# Patient Record
Sex: FEMALE | Race: WHITE | NOT HISPANIC OR LATINO | Employment: PART TIME | URBAN - METROPOLITAN AREA
[De-identification: names, ages, dates, MRNs, and addresses within clinical notes are randomized per-mention and may not be internally consistent; named-entity substitution may affect disease eponyms.]

---
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
## 2018-02-27 ENCOUNTER — OFFICE VISIT (OUTPATIENT)
Dept: MEDICAL GROUP | Facility: MEDICAL CENTER | Age: 18
End: 2018-02-27
Attending: INTERNAL MEDICINE
Payer: MEDICAID

## 2018-02-27 VITALS
DIASTOLIC BLOOD PRESSURE: 70 MMHG | SYSTOLIC BLOOD PRESSURE: 118 MMHG | OXYGEN SATURATION: 96 % | HEIGHT: 64 IN | TEMPERATURE: 97.7 F | HEART RATE: 82 BPM | WEIGHT: 123 LBS | RESPIRATION RATE: 16 BRPM | BODY MASS INDEX: 21 KG/M2

## 2018-02-27 DIAGNOSIS — Z72.0 TOBACCO USE: ICD-10-CM

## 2018-02-27 DIAGNOSIS — M79.645 PAIN OF FINGER OF LEFT HAND: ICD-10-CM

## 2018-02-27 DIAGNOSIS — J45.20 MILD INTERMITTENT ASTHMA WITHOUT COMPLICATION: ICD-10-CM

## 2018-02-27 PROBLEM — Z97.5 IUD (INTRAUTERINE DEVICE) IN PLACE: Status: ACTIVE | Noted: 2018-02-27

## 2018-02-27 PROBLEM — F12.90 MARIJUANA USE: Status: ACTIVE | Noted: 2018-02-27

## 2018-02-27 PROBLEM — Z86.59 HISTORY OF ANXIETY: Status: ACTIVE | Noted: 2018-02-27

## 2018-02-27 PROCEDURE — 99204 OFFICE O/P NEW MOD 45 MIN: CPT | Performed by: INTERNAL MEDICINE

## 2018-02-27 ASSESSMENT — PAIN SCALES - GENERAL: PAINLEVEL: 3=SLIGHT PAIN

## 2018-02-27 ASSESSMENT — PATIENT HEALTH QUESTIONNAIRE - PHQ9: CLINICAL INTERPRETATION OF PHQ2 SCORE: 0

## 2018-02-27 NOTE — PROGRESS NOTES
Marla Baca is a 17 y.o. female here for left fifth finger pain, establish care  HPI:    Mild intermittent asthma without complication  Patient reports a history of asthma since childhood. States that when she was younger she had numerous emergency room visits asthma exacerbations but these have tapered off that she's gotten older. She currently uses an albuterol inhaler about 4 times a week. She feels like her main triggers are cigarette smoking which she continues to do as well as allergies.   She has never been intubated for her asthma. She required a daily corticosteroid inhaler in the past but has not used this for several years.    Pain of finger of left hand  Patient reports one month ago she went snowboarding and fell. She landed on her left hand and  the pinky from the rest of the fingers. She had some swelling and pain in the fifth finger following that. She Buddy taped her fourth and fifth finger together for about a week. She continues to have some mild pain in the finger and she cannot fully flex it. Her PIP joint is swollen. Overall she feels like it is getting better.     Tobacco use  Patient currently smokes 3-4 cigarettes per day. She does not feel she is completely ready to quit at this point.    Current medicines (including changes today)  Current Outpatient Prescriptions   Medication Sig Dispense Refill   • albuterol 108 (90 BASE) MCG/ACT Aero Soln inhalation aerosol Inhale 2 Puffs by mouth every four hours as needed for Shortness of Breath. 1 Inhaler 0   • albuterol (PROVENTIL) 2.5mg/3ml Nebu Soln solution for nebulization 2.5 mg by Nebulization route every four hours as needed for Shortness of Breath.       No current facility-administered medications for this visit.      She  has a past medical history of Allergy; Anxiety; Asthma; Depression; Heart murmur; and Substance abuse.  She  has a past surgical history that includes nasal polypectomy.  Social History   Substance Use Topics  "  • Smoking status: Current Every Day Smoker     Packs/day: 0.25     Years: 2.00     Types: Cigarettes   • Smokeless tobacco: Never Used   • Alcohol use 1.2 oz/week     2 Cans of beer per week     Social History     Social History Narrative   • No narrative on file     Family History   Problem Relation Age of Onset   • Cancer Maternal Grandfather    • Diabetes Neg Hx    • Heart Disease Neg Hx    • Stroke Neg Hx    • Hyperlipidemia Neg Hx    • Hypertension Neg Hx          ROS  As above in HPI  All other systems reviewed and are negative     Objective:     Blood pressure 118/70, pulse 82, temperature 36.5 °C (97.7 °F), resp. rate 16, height 1.613 m (5' 3.5\"), weight 55.8 kg (123 lb), SpO2 96 %, not currently breastfeeding. Body mass index is 21.44 kg/m².  Physical Exam:    Constitutional: Alert, no distress.  Skin: Warm, dry, good turgor, no rashes in visible areas.  Eye: Equal, round and reactive, conjunctiva clear, lids normal.  ENMT: Lips without lesions, good dentition, oropharynx clear, TM's clear bilaterally.  Neck: Trachea midline, no masses, no thyromegaly. No cervical or supraclavicular lymphadenopathy.  Respiratory: Unlabored respiratory effort, lungs clear to auscultation, no wheezes, no ronchi.  Cardiovascular: Regular rate and rhythm, no murmurs appreciated, no lower extremity edema.  Abdomen: Soft, non-tender, no masses, no hepatosplenomegaly.  Psych: Alert and oriented x3, normal affect and mood.  MSK: PIP Joint of left fifth finger is swollen with reduced ROM and mild tenderness to palpation, no finger deformity      Assessment and Plan:   The following treatment plan was discussed    1. Mild intermittent asthma without complication  Stable, currently controlled with as needed albuterol. Encourage smoking cessation.  -Continue albuterol HFA when necessary    2. Pain of finger of left hand  Consistent with jammed finger. No evidence of fracture, and we discussed that her symptoms will slowly improve " over the next 1-2 months. We discussed marjorie taping the fourth and fifth fingers together she is having pain. No indication for x-ray today.    3. Tobacco use  Not currently interested in cessation. We discussed options to help quit, and she will contact us when she feels ready.        Followup: Return in about 1 year (around 2/27/2019), or if symptoms worsen or fail to improve, for annual.

## 2018-02-27 NOTE — ASSESSMENT & PLAN NOTE
Patient currently smokes 3-4 cigarettes per day. She does not feel she is completely ready to quit at this point.

## 2018-02-27 NOTE — ASSESSMENT & PLAN NOTE
Patient reports a history of asthma since childhood. States that when she was younger she had numerous emergency room visits asthma exacerbations but these have tapered off that she's gotten older. She currently uses an albuterol inhaler about 4 times a week. She feels like her main triggers are cigarette smoking which she continues to do as well as allergies.   She has never been intubated for her asthma. She required a daily corticosteroid inhaler in the past but has not used this for several years.

## 2018-02-27 NOTE — ASSESSMENT & PLAN NOTE
Patient reports one month ago she went snowboarding and fell. She landed on her left hand and  the pinky from the rest of the fingers. She had some swelling and pain in the fifth finger following that. She Mat taped her fourth and fifth finger together for about a week. She continues to have some mild pain in the finger and she cannot fully flex it. Her PIP joint is swollen. Overall she feels like it is getting better.

## 2018-08-02 ENCOUNTER — OFFICE VISIT (OUTPATIENT)
Dept: MEDICAL GROUP | Facility: MEDICAL CENTER | Age: 18
End: 2018-08-02
Attending: INTERNAL MEDICINE
Payer: MEDICAID

## 2018-08-02 VITALS
BODY MASS INDEX: 21.17 KG/M2 | RESPIRATION RATE: 16 BRPM | DIASTOLIC BLOOD PRESSURE: 78 MMHG | TEMPERATURE: 97.7 F | SYSTOLIC BLOOD PRESSURE: 122 MMHG | HEART RATE: 70 BPM | WEIGHT: 124 LBS | HEIGHT: 64 IN | OXYGEN SATURATION: 97 %

## 2018-08-02 DIAGNOSIS — R14.0 ABDOMINAL BLOATING: ICD-10-CM

## 2018-08-02 DIAGNOSIS — Z97.5 IUD (INTRAUTERINE DEVICE) IN PLACE: ICD-10-CM

## 2018-08-02 PROCEDURE — 99214 OFFICE O/P EST MOD 30 MIN: CPT | Performed by: INTERNAL MEDICINE

## 2018-08-02 PROCEDURE — 99212 OFFICE O/P EST SF 10 MIN: CPT | Performed by: INTERNAL MEDICINE

## 2018-08-02 RX ORDER — RANITIDINE 150 MG/1
150 TABLET ORAL
Qty: 30 TAB | Refills: 0 | Status: SHIPPED | OUTPATIENT
Start: 2018-08-02 | End: 2019-03-29

## 2018-08-02 ASSESSMENT — PAIN SCALES - GENERAL: PAINLEVEL: 4=SLIGHT-MODERATE PAIN

## 2018-08-02 NOTE — PROGRESS NOTES
"Subjective:   Marla Baca is a 18 y.o. female here today for abdominal bloating, concerns about IUD    Abdominal bloating  Patient reports noticing increased abdominal bloating for about the past month.  She feels like it happens after she eats.  She denies reduced appetite and it is not severe enough to prevent her from eating however she is uncomfortable.  She reports intermittent heartburn symptoms as well as occasional nausea in the mornings.  She denies diarrhea, constipation, melena, hematochezia.  She has not had any recent dietary changes.  She cannot identify any specific food triggers.    IUD (intrauterine device) in place  Patient reports that about a month or 2 ago she was having some pain with sex.  This has subsided now.  She was initially concerned that her IUD might be out of place.  She has a Mirena that was placed in 2016 in [place].  She is overall satisfied with this method of birth control.  She denies any vaginal discharge or dysuria.  Menstrual cycles have almost completely stopped with her IUD.       Current medicines (including changes today)  Current Outpatient Prescriptions   Medication Sig Dispense Refill   • raNITidine (ZANTAC) 150 MG Tab Take 1 Tab by mouth 1 time daily as needed for Heartburn. 30 Tab 0   • albuterol 108 (90 BASE) MCG/ACT Aero Soln inhalation aerosol Inhale 2 Puffs by mouth every four hours as needed for Shortness of Breath. 1 Inhaler 0   • albuterol (PROVENTIL) 2.5mg/3ml Nebu Soln solution for nebulization 2.5 mg by Nebulization route every four hours as needed for Shortness of Breath.       No current facility-administered medications for this visit.      She  has a past medical history of Allergy; Anxiety; Asthma; Depression; Heart murmur; and Substance abuse.    ROS   Denies chest pain, shortness breath  As above in HPI     Objective:     Blood pressure 122/78, pulse 70, temperature 36.5 °C (97.7 °F), resp. rate 16, height 1.613 m (5' 3.5\"), weight 56.2 kg (124 " lb), SpO2 97 %, not currently breastfeeding. Body mass index is 21.62 kg/m².   Physical Exam:  Constitutional: Alert, no distress.  Skin: Warm, dry, good turgor, no rashes in visible areas.  Eye: Equal, round and reactive, conjunctiva clear, lids normal.  Abdomen: Soft, mild tenderness to palpation in epigastric region without rebound or guarding, bowel sounds present, no masses, no hepatosplenomegaly.  Psych: Alert and oriented x3, normal affect and mood.      Assessment and Plan:   The following treatment plan was discussed    1. Abdominal bloating  Given her history of intermittent heartburn, question whether the bloating/dyspepsia is an early sign of her gastritis coming back.  We will have her start taking Zantac daily for the next 1-2 weeks.  If no improvement in symptoms then she may stop and she was instructed to follow-up.  If this does help, then she can continue to use it as needed.  There are no red flag history or physical exam findings that would prompt urgent abdominal imaging at this time.  - raNITidine (ZANTAC) 150 MG Tab; Take 1 Tab by mouth 1 time daily as needed for Heartburn.  Dispense: 30 Tab; Refill: 0    2. IUD (intrauterine device) in place  Dyspareunia has resolved.  Patient is overall happy with her IUD.  At this point, I do not think there is any need to obtain an ultrasound to confirm its position and she is overall asymptomatic.  Her bloating is much higher up in the abdomen and unlikely related to her IUD and I discussed this with her today.  We will continue to monitor.        Followup: Return if symptoms worsen or fail to improve.

## 2018-08-02 NOTE — ASSESSMENT & PLAN NOTE
Patient reports that about a month or 2 ago she was having some pain with sex.  This has subsided now.  She was initially concerned that her IUD might be out of place.  She has a Mirena that was placed in 2016 in [place].  She is overall satisfied with this method of birth control.  She denies any vaginal discharge or dysuria.  Menstrual cycles have almost completely stopped with her IUD.

## 2018-08-02 NOTE — ASSESSMENT & PLAN NOTE
Patient reports noticing increased abdominal bloating for about the past month.  She feels like it happens after she eats.  She denies reduced appetite and it is not severe enough to prevent her from eating however she is uncomfortable.  She reports intermittent heartburn symptoms as well as occasional nausea in the mornings.  She denies diarrhea, constipation, melena, hematochezia.  She has not had any recent dietary changes.  She cannot identify any specific food triggers.

## 2019-03-13 PROCEDURE — 99284 EMERGENCY DEPT VISIT MOD MDM: CPT

## 2019-03-14 ENCOUNTER — TELEPHONE (OUTPATIENT)
Dept: PHARMACY | Facility: MEDICAL CENTER | Age: 19
End: 2019-03-14

## 2019-03-14 ENCOUNTER — HOSPITAL ENCOUNTER (EMERGENCY)
Facility: MEDICAL CENTER | Age: 19
End: 2019-03-14
Attending: EMERGENCY MEDICINE
Payer: MEDICAID

## 2019-03-14 VITALS
DIASTOLIC BLOOD PRESSURE: 88 MMHG | OXYGEN SATURATION: 95 % | HEIGHT: 64 IN | WEIGHT: 158.29 LBS | SYSTOLIC BLOOD PRESSURE: 136 MMHG | RESPIRATION RATE: 18 BRPM | TEMPERATURE: 97.2 F | HEART RATE: 92 BPM | BODY MASS INDEX: 27.02 KG/M2

## 2019-03-14 DIAGNOSIS — J45.41 MODERATE PERSISTENT ASTHMA WITH ACUTE EXACERBATION: ICD-10-CM

## 2019-03-14 PROCEDURE — A9270 NON-COVERED ITEM OR SERVICE: HCPCS | Performed by: EMERGENCY MEDICINE

## 2019-03-14 PROCEDURE — 700102 HCHG RX REV CODE 250 W/ 637 OVERRIDE(OP): Performed by: EMERGENCY MEDICINE

## 2019-03-14 PROCEDURE — 700101 HCHG RX REV CODE 250: Performed by: EMERGENCY MEDICINE

## 2019-03-14 PROCEDURE — 94640 AIRWAY INHALATION TREATMENT: CPT

## 2019-03-14 RX ORDER — ALBUTEROL SULFATE 90 UG/1
2 AEROSOL, METERED RESPIRATORY (INHALATION) EVERY 4 HOURS PRN
Qty: 1 INHALER | Refills: 0 | Status: SHIPPED | OUTPATIENT
Start: 2019-03-14 | End: 2019-03-14 | Stop reason: SDUPTHER

## 2019-03-14 RX ORDER — CETIRIZINE HYDROCHLORIDE 10 MG/1
10 TABLET ORAL DAILY
Qty: 30 TAB | Refills: 0 | Status: SHIPPED | OUTPATIENT
Start: 2019-03-14 | End: 2019-03-14 | Stop reason: SDUPTHER

## 2019-03-14 RX ORDER — METHYLPREDNISOLONE 4 MG/1
TABLET ORAL
Qty: 1 KIT | Refills: 0 | Status: SHIPPED | OUTPATIENT
Start: 2019-03-14 | End: 2019-03-14 | Stop reason: SDUPTHER

## 2019-03-14 RX ORDER — CETIRIZINE HYDROCHLORIDE 10 MG/1
10 TABLET ORAL DAILY
Qty: 30 TAB | Refills: 0 | Status: SHIPPED | OUTPATIENT
Start: 2019-03-14 | End: 2019-03-29 | Stop reason: SDUPTHER

## 2019-03-14 RX ORDER — ALBUTEROL SULFATE 2.5 MG/3ML
2.5 SOLUTION RESPIRATORY (INHALATION) EVERY 4 HOURS PRN
Qty: 30 BULLET | Refills: 0 | Status: SHIPPED | OUTPATIENT
Start: 2019-03-14 | End: 2019-03-14 | Stop reason: SDUPTHER

## 2019-03-14 RX ORDER — ALBUTEROL SULFATE 90 UG/1
2 AEROSOL, METERED RESPIRATORY (INHALATION) EVERY 4 HOURS PRN
Qty: 1 INHALER | Refills: 0 | Status: SHIPPED | OUTPATIENT
Start: 2019-03-14

## 2019-03-14 RX ORDER — METHYLPREDNISOLONE 4 MG/1
TABLET ORAL
Qty: 1 KIT | Refills: 0 | Status: SHIPPED | OUTPATIENT
Start: 2019-03-14 | End: 2019-03-29

## 2019-03-14 RX ORDER — ALBUTEROL SULFATE 2.5 MG/3ML
2.5 SOLUTION RESPIRATORY (INHALATION) EVERY 4 HOURS PRN
Qty: 30 BULLET | Refills: 0 | Status: SHIPPED | OUTPATIENT
Start: 2019-03-14

## 2019-03-14 RX ORDER — DEXAMETHASONE 4 MG/1
16 TABLET ORAL ONCE
Status: COMPLETED | OUTPATIENT
Start: 2019-03-14 | End: 2019-03-14

## 2019-03-14 RX ADMIN — ALBUTEROL SULFATE 5 MG: 2.5 SOLUTION RESPIRATORY (INHALATION) at 02:39

## 2019-03-14 RX ADMIN — DEXAMETHASONE 16 MG: 4 TABLET ORAL at 01:51

## 2019-03-14 ASSESSMENT — ENCOUNTER SYMPTOMS
NUMBNESS: 0
SHORTNESS OF BREATH: 1

## 2019-03-14 NOTE — ED PROVIDER NOTES
"ED Provider Note    Scribed for Karly Penaloza M.D. by April Medina. 3/14/2019, 1:30 AM.    Primary care provider: Karolina Carballo M.D.  Means of arrival: Walk-In  History obtained from: Patient  History limited by: None    CHIEF COMPLAINT  Chief Complaint   Patient presents with   • Asthma     Pt. states hx of asthma. Pt. states concurrent congestion due to seasonal allergies. Pt. states her asthma has consistently been getting worse despite repeated rescue inhaler.    • Congestion       HPI  Marla Baca is a 18 y.o. Female with a history of asthma who presents to the Emergency Department for asthma related symptoms onset prior to arrival. Patient states she woke up from her sleep secondary to associated shortness of breath described as \"unable to breathe.\" Negative for numbness or tingling. She used her inhaler shortly after with no alleviation. She additionally reports she has used a rescue inhaler two times within 1.5 moths. Patient notes she usually takes 8 puffs daily from her regular inhaler daily. She reports her asthma is exacerbated with her seasonal allergies and from smoking marijuana. Patient states she is not currently pregnant and has an IUD.     REVIEW OF SYSTEMS  Review of Systems   Respiratory: Positive for shortness of breath.    Neurological: Negative for numbness.        Negative for tingling   All other systems reviewed and are negative.      PAST MEDICAL HISTORY   has a past medical history of Allergy; Anxiety; Asthma; Depression; Heart murmur; and Substance abuse (HCC).    SURGICAL HISTORY   has a past surgical history that includes nasal polypectomy.    SOCIAL HISTORY  Social History   Substance Use Topics   • Smoking status: Current Every Day Smoker     Packs/day: 0.25     Years: 2.00     Types: Cigarettes   • Smokeless tobacco: Never Used   • Alcohol use 1.2 oz/week     2 Cans of beer per week      History   Drug Use   • Types: Marijuana     Comment: smokes marijuana every " "other day       FAMILY HISTORY  Family History   Problem Relation Age of Onset   • Cancer Maternal Grandfather    • Diabetes Neg Hx    • Heart Disease Neg Hx    • Stroke Neg Hx    • Hyperlipidemia Neg Hx    • Hypertension Neg Hx        CURRENT MEDICATIONS  Reviewed.  See Encounter Summary.     ALLERGIES  Allergies   Allergen Reactions   • Nsaids Shortness of Breath       PHYSICAL EXAM  VITAL SIGNS: /88   Pulse 95   Temp 36.2 °C (97.2 °F) (Temporal)   Resp 20   Ht 1.626 m (5' 4\")   Wt 71.8 kg (158 lb 4.6 oz)   SpO2 96%   BMI 27.17 kg/m²   Constitutional: Alert in no apparent distress.  HENT: No signs of trauma, Bilateral external ears normal, Nose normal.   Eyes: Pupils are equal and reactive, Conjunctiva normal, Non-icteric.   Neck: Normal range of motion, No tenderness, Supple, No stridor.   Lymphatic: No lymphadenopathy noted.   Cardiovascular: Regular rate and rhythm, no murmurs.   Thorax & Lungs: Diffuse wheezing throughout, No increased work of breathing. No chest tenderness.   Abdomen: Soft, No tenderness, No masses, No pulsatile masses. No peritoneal signs.  Skin: Warm, Dry, No erythema, No rash.   Back: No bony tenderness, No CVA tenderness.   Extremities: Intact distal pulses, No edema, No tenderness, No cyanosis  Musculoskeletal: Good range of motion in all major joints. No tenderness to palpation or major deformities noted.   Neurologic: Alert , Normal motor function, Normal sensory function, No focal deficits noted.   Psychiatric: Affect normal, Judgment normal, Mood normal.     COURSE & MEDICAL DECISION MAKING  Pertinent Labs & Imaging studies reviewed. (See chart for details)    1:30 AM - Patient seen and examined at bedside. Patient will be treated with Proventil 2.5 mg/0.4 mL nebulizer and Decadron tablet 16 mg. Patient with be discharged home with prescribed Zyrtec, Medrol Dosepak, Compressor/Nebulizer, Proventil, and Albuterol 109 mcg/act to treat her symptoms. She was given " instructors on the dosages and route of medications. Patient was given opportunity to ask questions, and will be discharged at this time. Patient is understanding and agreeable to plan and discharge.    Decision Making:  This is a 18 y.o. year old female who presents with wheezing in the setting of chronic and inadequately managed asthma.  On my examination, patient had wheezing without hypoxia or increased work of breathing.  Feel that she is suffering from an asthma exacerbation, likely brought on by inadequate preventative medicines and continued smoking.      Patient was treated with albuterol and dexamethasone in the emergency department with some improvement.  She requested refills of multiple medications and was provided with these.  Do not feel that the patient currently has an infection requiring antibiotics as she has not had increased sputum production or fevers at home.  In her case, environmental triggers are more likely.      The patient will return for new or worsening symptoms and is stable at the time of discharge.    The patient is referred to a primary physician for blood pressure management, diabetic screening, and for all other preventative health concerns.      DISPOSITION:  Patient will be discharged home in stable condition.    FOLLOW UP:  Karolina Carballo M.D.  [address] [place] [id number]  [phone]    Schedule an appointment as soon as possible for a visit         OUTPATIENT MEDICATIONS:  Discharge Medication List as of 3/14/2019  2:33 AM      START taking these medications    Details   Nebulizers (COMPRESSOR/NEBULIZER) Misc 1 Each by Nebulization route Once for 1 dose., Disp-1 Each, R-0, Normal      MethylPREDNISolone (MEDROL DOSEPAK) 4 MG Tablet Therapy Pack Use as directed, Disp-1 Kit, R-0, Normal      cetirizine (ZYRTEC) 10 MG Tab Take 1 Tab by mouth every day., Disp-30 Tab, R-0, Normal             FINAL IMPRESSION  1. Moderate persistent asthma with acute exacerbation           I, April Median (Scribe), am scribing for, and in the presence of, Karly Penaloza M.D..    Electronically signed by: April Medina (Scribe), 3/14/2019    IKarly M.D. personally performed the services described in this documentation, as scribed by April Medina in my presence, and it is both accurate and complete.C    The note accurately reflects work and decisions made by me.  Karly Penaloza  3/14/2019  4:27 AM

## 2019-03-14 NOTE — DISCHARGE PLANNING
Agency/Facility Name: [facility]  Spoke To: Lyssa  Outcome: Accepted and will be delivered to the [address] [place] [address] address.

## 2019-03-14 NOTE — TELEPHONE ENCOUNTER
Patient called back and cannot fill prescriptions at [facility] as her insurance is not contracted there. Would like prescriptions sent to [facility] on [address].     Rx's re-transmitted to [facility].    Mariia Colunga, PharmD

## 2019-03-14 NOTE — DISCHARGE PLANNING
Received Choice form at 0800  Agency/Facility Name: [facility]  Referral sent per Choice form @ 0805     @0930  Agency/Facility Name: [facility]  Outcome: Sent DME order, was not put correctly into Epic so did not send with Referral.

## 2019-03-14 NOTE — DISCHARGE PLANNING
Medical Social Work    Referral: Nebulizer    Intervention: SW was notified that pt requiring nebulizer upon discharge. COLETTE explained to Charge RN, MD, and pt that nebulizer cannot be received prior to discharge due to DME companies being closed. COLETTE suggested that pt follow up w/ PCP in the morning. MD requesting that process for referral be started tonight so nebulizer can be delivered to pt first thing in the morning.     SW met w/ pt at bedside and explained DME Choice form. Pt signed Choice for [facility] and SW faxed Choice to [facility] ([id number]). SW explained to pt that referral will be sent in the morning to [facility] and will be pending acceptance. If referral is accepted pt requests home delivery of nebulizer to be sent to: [address] [facility] [id number]. Pt's phone is: [phone].     Plan: ER SW will follow up with DME referral during normal business hours to ensure that [facility] is contracted w/ pt's insurance and nebulizer can be delivered. If [facility] declines then SW will discuss w/ pt DME Choice for another DME provider.

## 2019-03-14 NOTE — DISCHARGE PLANNING
COLETTE notified by Derrick MONTENEGRO that Nebulizer will be delivered to home address by [facility] today. COLETTE has contacted family and notified them that Nebulizer should be delivered today.

## 2019-03-14 NOTE — ED TRIAGE NOTES
"Marla CERVANTES [facility]  18 y.o. Female  Chief Complaint   Patient presents with   • Asthma     Pt. states hx of asthma. Pt. states concurrent congestion due to seasonal allergies. Pt. states her asthma has consistently been getting worse despite repeated rescue inhaler.    • Congestion       /88   Pulse 95   Temp 36.2 °C (97.2 °F) (Temporal)   Resp 20   Ht 1.626 m (5' 4\")   Wt 71.8 kg (158 lb 4.6 oz)   SpO2 96%   BMI 27.17 kg/m²   No audible wheezing heard, just congestion. Pt. States she is an everyday smoker.   Pt amb to triage with steady gait for above complaint.   Pt is alert and oriented, speaking in full sentences, follows commands and responds appropriately to questions. NAD. Resp are even and unlabored.  Pt placed in lobby. Pt educated on triage process. Pt encouraged to alert staff for any changes.  "

## 2019-03-28 ENCOUNTER — HOSPITAL ENCOUNTER (EMERGENCY)
Facility: MEDICAL CENTER | Age: 19
End: 2019-03-28
Attending: EMERGENCY MEDICINE
Payer: MEDICAID

## 2019-03-28 VITALS
RESPIRATION RATE: 14 BRPM | HEIGHT: 63 IN | BODY MASS INDEX: 27.62 KG/M2 | DIASTOLIC BLOOD PRESSURE: 52 MMHG | WEIGHT: 155.87 LBS | SYSTOLIC BLOOD PRESSURE: 124 MMHG | HEART RATE: 110 BPM | TEMPERATURE: 97.9 F | OXYGEN SATURATION: 93 %

## 2019-03-28 DIAGNOSIS — Z72.0 TOBACCO ABUSE: ICD-10-CM

## 2019-03-28 DIAGNOSIS — R06.2 WHEEZING: ICD-10-CM

## 2019-03-28 DIAGNOSIS — J45.41 MODERATE PERSISTENT ASTHMA WITH ACUTE EXACERBATION: ICD-10-CM

## 2019-03-28 DIAGNOSIS — Z71.6 TOBACCO ABUSE COUNSELING: ICD-10-CM

## 2019-03-28 PROCEDURE — 700101 HCHG RX REV CODE 250: Performed by: EMERGENCY MEDICINE

## 2019-03-28 PROCEDURE — 94640 AIRWAY INHALATION TREATMENT: CPT

## 2019-03-28 PROCEDURE — 99284 EMERGENCY DEPT VISIT MOD MDM: CPT

## 2019-03-28 PROCEDURE — 700102 HCHG RX REV CODE 250 W/ 637 OVERRIDE(OP): Performed by: EMERGENCY MEDICINE

## 2019-03-28 PROCEDURE — A9270 NON-COVERED ITEM OR SERVICE: HCPCS | Performed by: EMERGENCY MEDICINE

## 2019-03-28 RX ORDER — IPRATROPIUM BROMIDE AND ALBUTEROL SULFATE 2.5; .5 MG/3ML; MG/3ML
3 SOLUTION RESPIRATORY (INHALATION) ONCE
Status: COMPLETED | OUTPATIENT
Start: 2019-03-28 | End: 2019-03-28

## 2019-03-28 RX ORDER — IPRATROPIUM BROMIDE AND ALBUTEROL SULFATE 2.5; .5 MG/3ML; MG/3ML
3 SOLUTION RESPIRATORY (INHALATION) 4 TIMES DAILY
Qty: 30 BULLET | Refills: 0 | Status: SHIPPED | OUTPATIENT
Start: 2019-03-28 | End: 2019-03-29 | Stop reason: SDUPTHER

## 2019-03-28 RX ORDER — METHYLPREDNISOLONE 4 MG/1
TABLET ORAL
Qty: 1 KIT | Refills: 0 | Status: SHIPPED | OUTPATIENT
Start: 2019-03-28 | End: 2019-08-16

## 2019-03-28 RX ORDER — DEXAMETHASONE 4 MG/1
8 TABLET ORAL ONCE
Status: COMPLETED | OUTPATIENT
Start: 2019-03-28 | End: 2019-03-28

## 2019-03-28 RX ADMIN — IPRATROPIUM BROMIDE AND ALBUTEROL SULFATE 3 ML: .5; 3 SOLUTION RESPIRATORY (INHALATION) at 13:58

## 2019-03-28 RX ADMIN — DEXAMETHASONE 8 MG: 4 TABLET ORAL at 13:32

## 2019-03-28 NOTE — ED PROVIDER NOTES
ED Provider Note     Scribed for Karly Broussard D.O. by Rashid Ellis. 3/28/2019, 12:52 PM.     Primary care provider: Karolina Carballo M.D.  Means of arrival: walk in         History obtained from: patient  History limited by: none    CHIEF COMPLAINT  Chief Complaint   Patient presents with   • Asthma     onset 2 days ago   • Cough     clear mucous   • Headache     secondary to cough       HPI  Marla Baca is a 18 y.o. female with a history of asthma who presents to the emergency Department complaining of sudden and persistent cough for the last 2 days. She states that her cough has not been improved with her rescue inhaler and nebulizer. Patient reports associated headache, clear sputum production, mild shortness of breath. She states that her symptoms came on recently and has not improved, prompting her to come to the ED for treatment. Patient states that she has had another episode of asthma exacerbation 2 weeks ago that required evaluation in the ED. She reports a history of daily 5 cigarette per day use. Patient denies fever.     REVIEW OF SYSTEMS  Pertinent positives include cough, sputum production, headache, shortness of breath. Pertinent negatives include no fever.   See HPI for further details. All other system reviews are negative.    PAST MEDICAL HISTORY  Past Medical History:   Diagnosis Date   • Allergy    • Anxiety    • Asthma    • Depression    • Heart murmur    • Substance abuse (HCC)     marijuana, tobacco       FAMILY HISTORY  Family History   Problem Relation Age of Onset   • Cancer Maternal Grandfather    • Diabetes Neg Hx    • Heart Disease Neg Hx    • Stroke Neg Hx    • Hyperlipidemia Neg Hx    • Hypertension Neg Hx        SOCIAL HISTORY  Social History   Substance Use Topics   • Smoking status: Current Every Day Smoker     Packs/day: 0.25     Years: 2.00     Types: Cigarettes   • Smokeless tobacco: Never Used   • Alcohol use 1.2 oz/week     2 Cans of beer per week      History   Drug  "Use   • Types: Marijuana     Comment: smokes marijuana every other day       SURGICAL HISTORY  Past Surgical History:   Procedure Laterality Date   • NASAL POLYPECTOMY         CURRENT MEDICATIONS  Current Outpatient Prescriptions:   •  albuterol 108 (90 Base) MCG/ACT Aero Soln inhalation aerosol, Inhale 2 Puffs by mouth every four hours as needed for Shortness of Breath., Disp: 1 Inhaler, Rfl: 0  •  albuterol (PROVENTIL) 2.5mg/3ml Nebu Soln solution for nebulization, 3 mL by Nebulization route every four hours as needed for Shortness of Breath., Disp: 30 Bullet, Rfl: 0  •  MethylPREDNISolone (MEDROL DOSEPAK) 4 MG Tablet Therapy Pack, Use as directed, Disp: 1 Kit, Rfl: 0  •  cetirizine (ZYRTEC) 10 MG Tab, Take 1 Tab by mouth every day., Disp: 30 Tab, Rfl: 0  •  raNITidine (ZANTAC) 150 MG Tab, Take 1 Tab by mouth 1 time daily as needed for Heartburn., Disp: 30 Tab, Rfl: 0    ALLERGIES  Allergies   Allergen Reactions   • Nsaids Shortness of Breath       PHYSICAL EXAM  VITAL SIGNS: /77   Pulse (!) 120   Temp 36.6 °C (97.9 °F) (Temporal)   Resp 14   Ht 1.6 m (5' 3\")   Wt 70.7 kg (155 lb 13.8 oz)   SpO2 96%   BMI 27.61 kg/m²     Constitutional: Patient is well developed, well nourished. Non-toxic appearing. Mild distress.   HENT: Normocephalic, atraumatic. TM's visualized without erythema. Nose normal with no mucosal edema or drainage. Oropharynx moist without erythema or exudates.  Eyes: PERRL, EOMI, Conjunctiva without erythema or exudates.   Neck: Supple with no cervical adenopathy. Normal range of motion in flexion, extension and lateral rotation. No tenderness along the bony prominences or paraspinal muscles.  Lymphatic: No lymphadenopathy noted.   Cardiovascular: Normal heart rate and Regular rhythm. No murmur.  Thorax & Lungs: Clear and equal breath sounds with good excursion. Mild respiratory distress, Tight expiratory wheezing diffusely in all lung fields.   Abdomen: Bowel sounds normal in all four " "quadrants. Soft, nontender, no rebound, guarding, palpable masses.   Skin: Warm, Dry, No erythema, No rashes.   Back: No cervical, thoracic, or lumbosacral tenderness. No CVA tenderness.   Extremities: Peripheral pulses 4/4 No edema, No tenderness.  Musculoskeletal: Normal range of motion in all major joints. No tenderness to palpation or major deformities noted.   Neurologic: Alert & oriented x 3, Normal motor function, Normal sensory function,  DTR's 4/4 bilaterally.  Psychiatric: Affect normal, Judgment normal, Mood normal.     DIAGNOSTICS/PROCEDURES    COURSE & MEDICAL DECISION MAKING  Pertinent Labs & Imaging studies reviewed. (See chart for details)    12:52 PM - Patient seen and evaluated at bedside. I counseled the patient on discontinuing her use of cigarettes to improve her overall chronic health. Patient will be treated with Duoneb, Decadron 8 mg for her symptoms. Differential diagnoses include, but are not limited to, asthma exacerbation    2:11 PM - Recheck: Patient re-evaluated at beside. Patient reports feeling greatly improved. Her Wheezing has resolved after interventions. . Patient's diagnostic results discussed. Discussed patient's condition and treatment plan. Patient will be discharged with instructions and provided with strict return precautions. Advised to follow up with her primary. Instructed to return to Emergency Department immediately if any new or worsening symptoms.    Discharge vitals: /77   Pulse (!) 113   Temp 36.6 °C (97.9 °F) (Temporal)   Resp 14   Ht 1.6 m (5' 3\")   Wt 70.7 kg (155 lb 13.8 oz)   SpO2 93%   BMI 27.61 kg/m²     The patient will return for new or worsening symptoms and is stable at the time of discharge.    The patient is referred to a primary physician for blood pressure management, diabetic screening, and for all other preventative health concerns.    DISPOSITION:  Patient will be discharged home in stable condition.    FOLLOW UP:  Karolina Carballo, " M.D.  [address]  [address]  [facility] [address]  [phone]    In 1 week  For recheck      FINAL IMPRESSION  1. Wheezing Active   2. Tobacco abuse Active   3. Tobacco abuse counseling Active   4. Moderate persistent asthma with acute exacerbation Active        IRashid (Scribe), am scribing for, and in the presence of, Karly Broussard D.O..    Electronically signed by: Rashid Ellis (Scribe), 3/28/2019    I, Karly Broussard D.O. personally performed the services described in this documentation, as scribed by Rashid Ellis in my presence, and it is both accurate and complete. E    The note accurately reflects work and decisions made by me.  Karly Broussard  3/28/2019  2:18 PM

## 2019-03-28 NOTE — DISCHARGE INSTRUCTIONS
Increase fluids especially water and water based products, no dairy products for the next 3-4 days  Cool mist humidifier at your bedside will help with the coughing at night.  Do not use her inhaler anymore than every 4-6 hours, if you need to use it more than that you need to come to the emergency department for treatment.  You need to stop smoking immediately!!!  Take the steroids with food until completely gone and use the nebulized treatment as needed.

## 2019-03-28 NOTE — ED NOTES
VSS.  Discharge instructions and prescriptions x 2 given- verbalizes understanding.   Ambulatory to lobby with steady gait.

## 2019-03-28 NOTE — ED TRIAGE NOTES
Chief Complaint   Patient presents with   • Asthma     onset 2 days ago   • Cough     clear mucous   • Headache     secondary to cough

## 2019-03-29 ENCOUNTER — OFFICE VISIT (OUTPATIENT)
Dept: MEDICAL GROUP | Facility: MEDICAL CENTER | Age: 19
End: 2019-03-29
Attending: INTERNAL MEDICINE
Payer: MEDICAID

## 2019-03-29 VITALS
OXYGEN SATURATION: 92 % | HEART RATE: 88 BPM | RESPIRATION RATE: 20 BRPM | TEMPERATURE: 98.1 F | HEIGHT: 63 IN | SYSTOLIC BLOOD PRESSURE: 124 MMHG | BODY MASS INDEX: 27.82 KG/M2 | WEIGHT: 157 LBS | DIASTOLIC BLOOD PRESSURE: 82 MMHG

## 2019-03-29 DIAGNOSIS — J45.21 MILD INTERMITTENT ASTHMA WITH ACUTE EXACERBATION: ICD-10-CM

## 2019-03-29 PROBLEM — M79.645 PAIN OF FINGER OF LEFT HAND: Status: RESOLVED | Noted: 2018-02-27 | Resolved: 2019-03-29

## 2019-03-29 PROBLEM — R14.0 ABDOMINAL BLOATING: Status: RESOLVED | Noted: 2018-08-02 | Resolved: 2019-03-29

## 2019-03-29 PROCEDURE — 99213 OFFICE O/P EST LOW 20 MIN: CPT | Performed by: INTERNAL MEDICINE

## 2019-03-29 PROCEDURE — 99214 OFFICE O/P EST MOD 30 MIN: CPT | Performed by: INTERNAL MEDICINE

## 2019-03-29 RX ORDER — CETIRIZINE HYDROCHLORIDE 10 MG/1
10 TABLET ORAL DAILY
Qty: 30 TAB | Refills: 6 | Status: SHIPPED | OUTPATIENT
Start: 2019-03-29

## 2019-03-29 RX ORDER — IPRATROPIUM BROMIDE AND ALBUTEROL SULFATE 2.5; .5 MG/3ML; MG/3ML
3 SOLUTION RESPIRATORY (INHALATION) 4 TIMES DAILY
Qty: 30 BULLET | Refills: 3 | Status: SHIPPED | OUTPATIENT
Start: 2019-03-29 | End: 2019-08-16

## 2019-03-29 NOTE — ASSESSMENT & PLAN NOTE
Patient has been to the emergency room twice in the past week for asthma exacerbation.  The first time, she was treated with Medrol Dosepak and albuterol nebs.  She returns because her symptoms had not completely resolved.  She reports getting sick with an upper respiratory infection in between the 2 exacerbations.  She was prescribed a second Medrol Dosepak as well as DuoNeb's the next time she was in the ER.  She feels better when she is taking the steroids but reports that for the past 1-1/2 months, she has been using her rescue inhaler very frequently and has gone through almost 2 inhalers during that time.  She would like to get back on a controller medication.  In the past, she has been on an inhaled corticosteroid.  She is also taking Zyrtec daily as she has significant allergies which she knows contributes to her asthma.  She has been on Singulair in the past but felt it did not work as well and she had more side effects with it.  She continues to smoke cigarettes and marijuana which she knows is a trigger for her asthma.  She has cut back to 5 cigarettes a day and is working on quitting by June.

## 2019-03-30 NOTE — PROGRESS NOTES
Subjective:   Marla Baca is a 18 y.o. female here today for follow-up emergency room visit for asthma exacerbation    Mild intermittent asthma with acute exacerbation  Patient has been to the emergency room twice in the past week for asthma exacerbation.  The first time, she was treated with Medrol Dosepak and albuterol nebs.  She returns because her symptoms had not completely resolved.  She reports getting sick with an upper respiratory infection in between the 2 exacerbations.  She was prescribed a second Medrol Dosepak as well as DuoNeb's the next time she was in the ER.  She feels better when she is taking the steroids but reports that for the past 1-1/2 months, she has been using her rescue inhaler very frequently and has gone through almost 2 inhalers during that time.  She would like to get back on a controller medication.  In the past, she has been on an inhaled corticosteroid.  She is also taking Zyrtec daily as she has significant allergies which she knows contributes to her asthma.  She has been on Singulair in the past but felt it did not work as well and she had more side effects with it.  She continues to smoke cigarettes and marijuana which she knows is a trigger for her asthma.  She has cut back to 5 cigarettes a day and is working on quitting by June.        Current medicines (including changes today)  Current Outpatient Prescriptions   Medication Sig Dispense Refill   • cetirizine (ZYRTEC) 10 MG Tab Take 1 Tab by mouth every day. 30 Tab 6   • ipratropium-albuterol (DUONEB) 0.5-2.5 (3) MG/3ML nebulizer solution 3 mL by Nebulization route 4 times a day. 30 Bullet 3   • Fluticasone Furoate (ARNUITY ELLIPTA) 100 MCG/ACT AEROSOL POWDER, BREATH ACTIVATED Inhale 1 Inhalation by mouth every day. 30 Each 2   • MethylPREDNISolone (MEDROL DOSEPAK) 4 MG Tablet Therapy Pack As directed with food 1 Kit 0   • albuterol 108 (90 Base) MCG/ACT Aero Soln inhalation aerosol Inhale 2 Puffs by mouth every four hours  "as needed for Shortness of Breath. 1 Inhaler 0   • albuterol (PROVENTIL) 2.5mg/3ml Nebu Soln solution for nebulization 3 mL by Nebulization route every four hours as needed for Shortness of Breath. 30 Bullet 0     No current facility-administered medications for this visit.      She  has a past medical history of Allergy; Anxiety; Asthma; Depression; Heart murmur; and Substance abuse (HCC).    ROS   Denies chest pain, shortness of breath  As above in HPI     Objective:     Blood pressure 124/82, pulse 88, temperature 36.7 °C (98.1 °F), temperature source Temporal, resp. rate 20, height 1.6 m (5' 2.99\"), weight 71.2 kg (157 lb), SpO2 92 %, not currently breastfeeding. Body mass index is 27.82 kg/m².   Physical Exam:  Constitutional: Alert, no distress.  Skin: Warm, dry, good turgor, no rashes in visible areas.  Eye: Equal, round and reactive, conjunctiva clear, lids normal.  Respiratory: Unlabored respiratory effort, scattered diffuse inspiratory and expiratory wheezing bilaterally cardiovascular: Regular rate and rhythm      Assessment and Plan:   The following treatment plan was discussed    1. Mild intermittent asthma with acute exacerbation  Given her more frequent albuterol use over the past month and a half in conjunction with her recent exacerbations, we will restart her on a controller medication.  Prescription for Arnuity Ellipta sent.  She will continue her albuterol if needed, duo nebs as needed, and Zyrtec which I refilled today.  She will take her Medrol Dosepak to completion.  She will follow-up with me in 4 weeks  - cetirizine (ZYRTEC) 10 MG Tab; Take 1 Tab by mouth every day.  Dispense: 30 Tab; Refill: 6  - ipratropium-albuterol (DUONEB) 0.5-2.5 (3) MG/3ML nebulizer solution; 3 mL by Nebulization route 4 times a day.  Dispense: 30 Bullet; Refill: 3  -Arnuity Ellipta 100 mcg 1 inhalation daily  -Smoking cessation was addressed and strongly encouraged.  Nicotine supplementation was offered which patient " declines  -Follow-up in 4 weeks        Followup: Return in about 4 weeks (around 4/26/2019), or if symptoms worsen or fail to improve, for asthma, smoking cessation.

## 2019-07-24 ENCOUNTER — PATIENT MESSAGE (OUTPATIENT)
Dept: MEDICAL GROUP | Facility: MEDICAL CENTER | Age: 19
End: 2019-07-24

## 2019-08-16 ENCOUNTER — APPOINTMENT (OUTPATIENT)
Dept: RADIOLOGY | Facility: MEDICAL CENTER | Age: 19
End: 2019-08-16
Attending: EMERGENCY MEDICINE
Payer: MEDICAID

## 2019-08-16 ENCOUNTER — HOSPITAL ENCOUNTER (EMERGENCY)
Facility: MEDICAL CENTER | Age: 19
End: 2019-08-16
Attending: EMERGENCY MEDICINE
Payer: MEDICAID

## 2019-08-16 VITALS
HEART RATE: 95 BPM | TEMPERATURE: 98 F | RESPIRATION RATE: 17 BRPM | HEIGHT: 64 IN | BODY MASS INDEX: 28.15 KG/M2 | DIASTOLIC BLOOD PRESSURE: 82 MMHG | OXYGEN SATURATION: 97 % | SYSTOLIC BLOOD PRESSURE: 126 MMHG | WEIGHT: 164.9 LBS

## 2019-08-16 DIAGNOSIS — M79.672 LEFT FOOT PAIN: ICD-10-CM

## 2019-08-16 PROCEDURE — A9270 NON-COVERED ITEM OR SERVICE: HCPCS | Performed by: EMERGENCY MEDICINE

## 2019-08-16 PROCEDURE — 700102 HCHG RX REV CODE 250 W/ 637 OVERRIDE(OP): Performed by: EMERGENCY MEDICINE

## 2019-08-16 PROCEDURE — 73630 X-RAY EXAM OF FOOT: CPT | Mod: LT

## 2019-08-16 PROCEDURE — 99284 EMERGENCY DEPT VISIT MOD MDM: CPT

## 2019-08-16 RX ORDER — ACETAMINOPHEN 500 MG
1000 TABLET ORAL ONCE
Status: COMPLETED | OUTPATIENT
Start: 2019-08-16 | End: 2019-08-16

## 2019-08-16 RX ADMIN — ACETAMINOPHEN 1000 MG: 500 TABLET ORAL at 14:19

## 2019-08-16 ASSESSMENT — ENCOUNTER SYMPTOMS
ROS SKIN COMMENTS: ABRASIONS
TINGLING: 0
FOCAL WEAKNESS: 0
HEADACHES: 0
DIZZINESS: 0

## 2019-08-16 NOTE — ED NOTES
Received orders for DC. Educated regarding f/u with ortho MD and ice and elevation. All questions addressed. Ambulatory with crutches to lobby. VSS.

## 2019-08-16 NOTE — ED TRIAGE NOTES
To triage via w/c with report of   Chief Complaint   Patient presents with   • T-5000 FALL     down 6 concrete stairs last night.  denies LOC.  continued left foot pain.  swelling and bruising noted to lateral side of foot.  CMS intact.  reports non-weight baring due to pain

## 2019-08-16 NOTE — ED PROVIDER NOTES
ED Provider Note   8/16/2019  1:11 PM    Means of Arrival: Walk In  History obtained by: patient  Limitations: none    CHIEF COMPLAINT  Chief Complaint   Patient presents with   • T-5000 FALL     down 6 concrete stairs last night.  denies LOC.  continued left foot pain.  swelling and bruising noted to lateral side of foot.  CMS intact.  reports non-weight baring due to pain       HPI  Marla Baca is a 19 y.o. female with concerns of left foot pain.  [place] have [place] stairs.  She admits to alcohol use prior to event.  Denies any loss of consciousness.  Has been unable to bear weight on the foot today.  Also reports abrasions to her right knee and right elbow.    REVIEW OF SYSTEMS  Review of Systems   Constitutional: Negative for malaise/fatigue.   Musculoskeletal:        Left foot pain   Skin:        Abrasions   Neurological: Negative for dizziness, tingling, focal weakness and headaches.     See HPI for further details.     PAST MEDICAL HISTORY   has a past medical history of Allergy, Anxiety, Asthma, Depression, Heart murmur, and Substance abuse (HCC).    SOCIAL HISTORY  Social History     Tobacco Use   • Smoking status: Current Every Day Smoker     Packs/day: 0.25     Years: 2.00     Pack years: 0.50     Types: Cigarettes   • Smokeless tobacco: Never Used   Substance and Sexual Activity   • Alcohol use: Yes     Alcohol/week: 1.2 oz     Types: 2 Cans of beer per week   • Drug use: Not Currently     Types: Marijuana     Comment: smokes marijuana every other day   • Sexual activity: Yes     Partners: Male     Birth control/protection: IUD       SURGICAL HISTORY   has a past surgical history that includes nasal polypectomy.    CURRENT MEDICATIONS  Home Medications    **Home medications have not yet been reviewed for this encounter**         ALLERGIES  Allergies   Allergen Reactions   • Nsaids Shortness of Breath       PHYSICAL EXAM  VITAL SIGNS: /88    "Pulse (!) 105   Temp 36.7 °C (98.1 °F) (Temporal)   Resp 16   Ht 1.626 m (5' 4\")   Wt 74.8 kg (164 lb 14.5 oz)   SpO2 97%   BMI 28.31 kg/m²    Pulse ox interpretation: I interpret this pulse ox as normal.  Constitutional: Alert in no apparent distress.  HENT: Normocephalic, Atraumatic, Bilateral external ears normal. Nose normal.   Eyes: Pupils are equal. Conjunctiva normal, non-icteric.   Heart: Regular rate   Lungs: No respiratory distress, regular respirations. Clear to auscultation bilaterally.  Abdomen: Nondistended, nontender.  Skin: Warm, Dry, No erythema, No rash.   Neurologic: Alert, Grossly non-focal. No slurred speech. Moving extremities normally.   MSK: At left lateral foot over the fifth metatarsal there is edema and tenderness.  There is abrasion at the right knee.  Abrasion at the right elbow.  Otherwise all the remainder joints with normal range of motion.  5 out of 5 strength in all extremities.  Psychiatric: Affect normal, Judgment normal, Mood normal, Appears appropriate and not intoxicated.   Physical Exam      COURSE & MEDICAL DECISION MAKING  Pertinent Labs & Imaging studies reviewed. (See chart for details)    1:11 PM This is an emergent evaluation of a 19 y.o., female who presents with concerns of left foot pain after falling downstairs last night.  She had no loss consciousness.  On exam there is edema, tenderness over the left fifth metatarsal.  Concerns for possible fracture, dislocation.  X-ray will be done.  Will give Tylenol for pain.    1:47 PM  X-ray shows no fracture dislocation.  Given the amount of edema and pain she will be provided with a hard sole shoe.  I also given her information for orthopedic clinic follow-up.     The patient will return for worsening symptoms and is stable at the time of discharge. The patient verbalizes understanding. Guidance was provided on appropriate use of medications including driving under the influence, overdose, and side effects.     FINAL " IMPRESSION  1. Left foot pain               Electronically signed by: Ben Oliva II, 8/16/2019 1:11 PM